# Patient Record
Sex: FEMALE | Race: WHITE | NOT HISPANIC OR LATINO | Employment: UNEMPLOYED | URBAN - METROPOLITAN AREA
[De-identification: names, ages, dates, MRNs, and addresses within clinical notes are randomized per-mention and may not be internally consistent; named-entity substitution may affect disease eponyms.]

---
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
## 2019-01-11 NOTE — OP NOTE
Elisa Michael  : 2000  MRN: [id number]  CSN: [id number]     Section Operative Note    Pre-Operative Dx:   1. Intrauterine pregnancy at 40w1d weeks   2. Non-reassuring fetal status      Postoperative dx:    1. Intrauterine pregnancy at 40w1d weeks 2.   Non-reassuring fetal status        Procedure: Primary  (LTCS)  - 2 layer closure       Surgeon: Nawaf Ward MD   Assistant: Keon Blandon       Anesthesia: Spinal        EBL: 600 mls.   IV Fluids: 1800 mls.   UOP: 200 mls.     Antibiotics: cefazolin 2 gms     Infant:      Name:  UNKNOWN      Gender: male  infant    Weight: 3591 g (7 lb 14.7 oz)     Apgars:    @ 1 minute /    @ 5 minutes    Cord gases: Venous:  @BABYNOHDR(BRIEFLAB, PHCVEN, BECVEN)@     Arterial:  @BABYNOHDR(BRIEFLAB, PHCART, BECART)@     Procedure Details:   After the patient was adequately anesthetized, she was sterilely prepped and draped in the dorsal supine left lateral tilt position. A Pfannenstiel incision was created sharply with the knife. It was carried down to the fascia with the Bovie.  The fascia was cut transversely with the knife and extended in a curvilinear fashion with scissors. The fascia was freed from its midline insertion superiorly and inferiorly. Rectus muscles were  in the midline. The peritoneum was sharply entered. The Hilton ring retractor was placed and locked down without complication. A bladder flap was sharply created. The lower uterine segment was scored transversely with the knife. Clear amniotic fluid was seen. The infant's head was delivered atraumatically. The mouth and nose were bulb suctioned. The infant was handed to the delivery team which was in attendance. The placenta was spontaneously extracted. The uterus was remained in situ and wiped free of debris and clot. The uterine incision was closed with #0 Monocryl in a continuous running locking fashion. The bladder flap was reapproximated.    The ring  January 11, 2019     Patient: Danny Shipley   YOB: 2002   Date of Visit: 1/11/2019       To Whom it May Concern:    Danny Shilpey was seen in my clinic on 1/11/2019 at 9:30 am. Please excuse Danny for her absence from school on this day to make the appointment.    If you have any questions or concerns, please don't hesitate to call.      Sincerely,         Polly Welch PA-C        CC: No Recipients     retractor was removed. The paracolic gutters were cleared of debris and clot. Parietal peritoneum and posterior rectus sheath were closed with 3-0 Vicryl. The fascia was closed with 0 PDS sewing from either corner, tying in the midline and burying all knots. The subcutaneous tissue was copiously irrigated. Elva's fascia was closed with 3-0 plain gut and the skin was closed with 4-0 Monocryl subcuticularly. Skin glue was applied.  All counts were correct.         Complications:   None      Disposition:   Mother to Mother Baby/Postpartum  in stable condition currently.   Baby to NBN  in stable condition currently.       Nawaf Ward MD  12/21/2017  3:43 PM

## (undated) DEVICE — SUT GUT CHRM 2/0 CT1 27IN 811H

## (undated) DEVICE — TRY SPINE BLCK WHITACRE 25G 3X5IN

## (undated) DEVICE — 39" SINGLE PATIENT USE HOVERMATT: Brand: SINGLE PATIENT USE HOVERMATT

## (undated) DEVICE — PK C/SECT 10

## (undated) DEVICE — SUT MONOCRYL 0 MO4 CR8 27IN YY71G ETYY71G

## (undated) DEVICE — SOL IRR H2O BTL 1000ML STRL

## (undated) DEVICE — SOL IRR NACL 0.9PCT BT 1000ML

## (undated) DEVICE — GLV SURG BIOGEL LTX PF 8 1/2

## (undated) DEVICE — SKIN AFFIX SURG ADHESIVE 72/CS 0.55ML: Brand: MEDLINE

## (undated) DEVICE — SUT PLAIN  3/0 CT1 27IN 842H

## (undated) DEVICE — SUT PDS 0 CTX 36IN DYED Z370T

## (undated) DEVICE — SUT MONOCRYL PLS ANTIB UND 3/0  PS1 27IN

## (undated) DEVICE — SUT MNCRYL 3/0 27L Y523H BX/36